# Patient Record
(demographics unavailable — no encounter records)

---

## 2025-03-20 NOTE — PHYSICAL EXAM
[Alert] : alert [No Acute Distress] : no acute distress [Cooperative] : cooperative [Normocephalic] : normocephalic [Conjunctivae with no discharge] : conjunctivae with no discharge [PERRL] : PERRL [EOMI Bilateral] : EOMI bilateral [Auricles Well Formed] : auricles well formed [Clear Tympanic membranes with present light reflex and bony landmarks] : clear tympanic membranes with present light reflex and bony landmarks [No Discharge] : no discharge [Nares Patent] : nares patent [Pink Nasal Mucosa] : pink nasal mucosa [Palate Intact] : palate intact [Nonerythematous Oropharynx] : nonerythematous oropharynx [Supple, full passive range of motion] : supple, full passive range of motion [No Palpable Masses] : no palpable masses [Symmetric Chest Rise] : symmetric chest rise [Clear to Auscultation Bilaterally] : clear to auscultation bilaterally [Regular Rate and Rhythm] : regular rate and rhythm [Normal S1, S2 present] : normal S1, S2 present [No Murmurs] : no murmurs [+2 Femoral Pulses] : +2 femoral pulses [Soft] : soft [NonTender] : non tender [Non Distended] : non distended [Normoactive Bowel Sounds] : normoactive bowel sounds [No Hepatomegaly] : no hepatomegaly [No Splenomegaly] : no splenomegaly [Arnold: _____] : Arnold [unfilled] [Patent] : patent [No fissures] : no fissures [No Abnormal Lymph Nodes Palpated] : no abnormal lymph nodes palpated [No Gait Asymmetry] : no gait asymmetry [No pain or deformities with palpation of bone, muscles, joints] : no pain or deformities with palpation of bone, muscles, joints [Normal Muscle Tone] : normal muscle tone [Straight] : straight [+2 Patella DTR] : +2 patella DTR [Cranial Nerves Grossly Intact] : cranial nerves grossly intact [No Rash or Lesions] : no rash or lesions

## 2025-03-20 NOTE — HISTORY OF PRESENT ILLNESS
[Parents] : parents [Grade ___] : Grade [unfilled] [Fruit] : fruit [Meat] : meat [Grains] : grains [Eggs] : eggs [Dairy] : dairy [___ stools per day] : [unfilled]  stools per day [Normal] : Normal [Brushing teeth twice/d] : brushing teeth twice per day [Yes] : Patient goes to dentist yearly [Toothpaste] : Primary Fluoride Source: Toothpaste [Playtime (60 min/d)] : playtime 60 min a day [< 2 hrs of screen time per day] : less than 2 hrs of screen time per day [Appropiate parent-child-sibling interaction] : appropriate parent-child-sibling interaction [No] : No cigarette smoke exposure [Supervised around water] : supervised around water [Wears helmet and pads] : wears helmet and pads [Monitored computer use] : monitored computer use [Up to date] : Up to date [NO] : No [FreeTextEntry3] : Having trouble falling asleep  [de-identified] : Had 1 cavity  [FreeTextEntry9] : Doing karate, dancing in the house [de-identified] : ; school is going well. Likes math and learning different languages.

## 2025-03-20 NOTE — HISTORY OF PRESENT ILLNESS
[Parents] : parents [Grade ___] : Grade [unfilled] [Fruit] : fruit [Meat] : meat [Grains] : grains [Eggs] : eggs [Dairy] : dairy [___ stools per day] : [unfilled]  stools per day [Normal] : Normal [Brushing teeth twice/d] : brushing teeth twice per day [Yes] : Patient goes to dentist yearly [Toothpaste] : Primary Fluoride Source: Toothpaste [Playtime (60 min/d)] : playtime 60 min a day [< 2 hrs of screen time per day] : less than 2 hrs of screen time per day [Appropiate parent-child-sibling interaction] : appropriate parent-child-sibling interaction [No] : No cigarette smoke exposure [Supervised around water] : supervised around water [Wears helmet and pads] : wears helmet and pads [Monitored computer use] : monitored computer use [Up to date] : Up to date [NO] : No [FreeTextEntry3] : Having trouble falling asleep  [de-identified] : Had 1 cavity  [FreeTextEntry9] : Doing karate, dancing in the house [de-identified] : ; school is going well. Likes math and learning different languages.

## 2025-03-20 NOTE — DISCUSSION/SUMMARY
[School] : school [Development and Mental Health] : development and mental health [Nutrition and Physical Activity] : nutrition and physical activity [Oral Health] : oral health [Safety] : safety [Mother] : mother [Father] : father [Full Activity without restrictions including Physical Education & Athletics] : Full Activity without restrictions including Physical Education & Athletics [FreeTextEntry1] :  8 year old F presenting for WCC. PE and vitals wnl. Appropriate growth and development. BMI 90%.   Plan: - Continue balanced diet with all food groups - Brush teeth twice a day with toothbrush. Recommend visit to dentist - Help child to maintain consistent daily routines and sleep schedule - School discussed - Ensure home is safe. Teach child about personal safety - Use consistent, positive discipline - Limit screen time to no more than 2 hours per day except for schoolwork - Encourage physical activity - Child needs to ride in a belt-positioning booster seat until  4 feet 9 inches has been reached and are between 8 and 12 years of age.   Return 1 year for routine well child check. F/u labs